# Patient Record
Sex: FEMALE | Race: WHITE | NOT HISPANIC OR LATINO | Employment: FULL TIME | URBAN - METROPOLITAN AREA
[De-identification: names, ages, dates, MRNs, and addresses within clinical notes are randomized per-mention and may not be internally consistent; named-entity substitution may affect disease eponyms.]

---

## 2024-01-23 ENCOUNTER — TRANSFERRED RECORDS (OUTPATIENT)
Dept: HEALTH INFORMATION MANAGEMENT | Facility: CLINIC | Age: 24
End: 2024-01-23
Payer: COMMERCIAL

## 2024-02-05 ENCOUNTER — TRANSCRIBE ORDERS (OUTPATIENT)
Dept: OTHER | Age: 24
End: 2024-02-05

## 2024-02-05 DIAGNOSIS — M24.9 HYPERMOBILITY OF JOINT: Primary | ICD-10-CM

## 2024-02-15 ENCOUNTER — TRANSCRIBE ORDERS (OUTPATIENT)
Dept: OTHER | Age: 24
End: 2024-02-15

## 2024-02-15 DIAGNOSIS — M24.9 HYPERMOBILITY OF JOINT: Primary | ICD-10-CM

## 2024-02-19 ENCOUNTER — TELEPHONE (OUTPATIENT)
Dept: CONSULT | Facility: CLINIC | Age: 24
End: 2024-02-19
Payer: COMMERCIAL

## 2024-02-19 NOTE — TELEPHONE ENCOUNTER
Referral received from outside clinic for patient to be seen in Genetics for Joint Hypermobility/EDS. Patient's chart reviewed by Genetics team--unable to schedule patient as department does not see adult patients for hypermobility/EDS. Letter sent to patient informing them of this.

## 2024-08-21 ENCOUNTER — TRANSCRIBE ORDERS (OUTPATIENT)
Dept: OTHER | Age: 24
End: 2024-08-21

## 2024-08-21 DIAGNOSIS — M24.9 HYPERMOBILITY OF JOINT: Primary | ICD-10-CM

## 2025-04-27 ENCOUNTER — HEALTH MAINTENANCE LETTER (OUTPATIENT)
Age: 25
End: 2025-04-27

## 2025-04-29 ENCOUNTER — OFFICE VISIT (OUTPATIENT)
Dept: RHEUMATOLOGY | Facility: CLINIC | Age: 25
End: 2025-04-29
Payer: COMMERCIAL

## 2025-04-29 VITALS
OXYGEN SATURATION: 100 % | WEIGHT: 252.6 LBS | DIASTOLIC BLOOD PRESSURE: 85 MMHG | SYSTOLIC BLOOD PRESSURE: 134 MMHG | HEART RATE: 74 BPM

## 2025-04-29 DIAGNOSIS — M35.7 BENIGN JOINT HYPERMOBILITY: Primary | ICD-10-CM

## 2025-04-29 DIAGNOSIS — T81.31XD POSTOPERATIVE WOUND DEHISCENCE, SUBSEQUENT ENCOUNTER: ICD-10-CM

## 2025-04-29 DIAGNOSIS — Z31.69 ENCOUNTER FOR PRECONCEPTION CONSULTATION: ICD-10-CM

## 2025-04-29 PROCEDURE — 99204 OFFICE O/P NEW MOD 45 MIN: CPT | Performed by: INTERNAL MEDICINE

## 2025-04-29 PROCEDURE — 3079F DIAST BP 80-89 MM HG: CPT | Performed by: INTERNAL MEDICINE

## 2025-04-29 PROCEDURE — 3075F SYST BP GE 130 - 139MM HG: CPT | Performed by: INTERNAL MEDICINE

## 2025-04-29 RX ORDER — ACETAMINOPHEN 500 MG
500 TABLET ORAL
COMMUNITY

## 2025-04-29 RX ORDER — NORETHINDRONE AND ETHINYL ESTRADIOL
1 KIT EVERY MORNING
COMMUNITY

## 2025-04-29 NOTE — Clinical Note
Please fax my clinic note dated 4/29/2025 to Ms. Oh's PCP:  Mariam Hinton [facility], [facility]  Thank you, Conner Russo MD 4/29/2025 12:30 PM

## 2025-04-29 NOTE — PROGRESS NOTES
Clinic note faxed to  Mariam Hinton CNP, [facility]   Olivia Roe, [facility]  4/29/2025

## 2025-04-29 NOTE — PATIENT INSTRUCTIONS
RHEUMATOLOGY    [facility]  [address] [place] [address]  [place] [address]    Phone number: [phone]  Fax number: [phone]    If you need a medication refill, please contact us as you may need lab work and/or a follow up visit prior to your refill.      Thank you for choosing [facility]!    Olivia Roe CMA Rheumatology

## 2025-04-29 NOTE — PROGRESS NOTES
Rheumatology Clinic Visit      Hyun Oh MRN# [id number]   YOB: 2000 Age: 24 year old      Date of visit: 4/29/25   Referring provider: Mariam Hinton [facility], [facility]    Chief Complaint   Patient presents with:  Consult: PCP thinks she may have a tissue disorder. Has joint pain in ankles. Does not heal from surgery, took 8-9 months from ankle surgery.     Assessment and Plan     1.  Joint hypermobility, and a history of unexplained surgical wound dehiscence: Beighton score for joint hypermobility: 8.  History of posterior heel injury requiring 9 months to heal after surgical correction (cutaneous lesion only; did not involve the Achilles tendon per patient), and 8 months to heal after a tarsal tunnel surgery when she was 17 years old.  She notes that the tarsal tunnel surgery was complicated by wound dehiscence, wound vac disrupting the sutures, and general delayed healing.  She reports that she was discussing conception planning with her primary care provider and wanted to figure out why she healed so slowly after the tarsal tunnel surgery, prompting referral to genetics and rheumatology.  I agree with joint hypermobility diagnosis and we discussed the rationale for starting physical therapy and avoiding joint hyperextension; patient is in agreement with going to physical therapy.  Long-term management of joint hypermobility per PCP.  We also discussed genetics referral and I agree with her primary care providers referral to genetics; will place another referral to genetics today as well.  Normal echocardiogram on 2/12/2024 at [facility].   - Physical therapy referral for joint hypermobility  - Genetics referral for joint hypermobility and history of unexplained surgical wound dehiscence    2.  Hyperhidrosis: Primarily affecting hands and feet.  She has followed with her primary care provider for this and dermatology in the distant past; advised that she consider seeing  dermatology again to see if there are newer treatments she could consider, if this bothers her.    3.  Possible Raynaud's?:  White discoloration of her fingers with cold exposure reported by the patient but no other color changes.  Reportedly controlled with cold avoidance that she will continue doing.    Total minutes spent in evaluation with patient, documentation, , and review of pertinent studies and chart notes: 48     Ms. Oh verbalized agreement with and understanding of the rational for the diagnosis and treatment plan.  All questions were answered to best of my ability and the patient's satisfaction. Ms. Oh was advised to contact the clinic with any questions that may arise after the clinic visit.      Thank you for involving me in the care of the patient    Return to clinic: No scheduled return appointment in rheumatology needed at this time. Return PRN.     HPI   Hyun Oh is a 24 year old female with a past medical history significant for hyperhidrosis, chronic pain in both ankles, and joint hypermobility who presents for evaluation of joint hypermobility and history of delayed wound healing    2/12/2024 [facility] cardiology note documents an echocardiogram that is normal.    1/23/2024 family medicine note documents hyperhidrosis and delayed healing after ankle surgery and with small wounds.  Also with hypermobile joints.    Today, 4/29/2025: bilateral tarsal tunnel surgery at the age 18 yo, requiring 4 surgeries because of wound dehiscence, the wound vac disrupting the sutures, and eventually healing after 8 months. Then when discussing family planning with her PCP, the patient states that she says she didn't want to conceive until she has figured out the issue with her healing delay as.  She also notes that she had a cutaneous lesion on the posterior aspect of her left heel, not involving the Achilles tendon, that took 9 months to heal.  Knee pain with long walks or when  playing tennis or pickleball so avoids sports. Better with rest. Hips and back are similar in quality and also worse with activity.  Morning stiffness for about 10 minutes.  Upper extremities are okay.     White discoloration of the fingers with cold exposure that is controlled with cold avoidance/keeping warm.  No other color changes of the fingers.    Hyperthyrosis of the hands that she says is managed by her PCP and she has seen dermatology in the past for this.    Denies fevers, chills, nausea, vomiting, constipation, diarrhea.  No black or bloody stools.  No hematuria.  No abdominal pain. No chest pain/pressure, palpitations, or shortness of breath. No LE swelling. No neck pain. No oral or nasal sores.  No rash. No sicca symptoms. No photosensitivity or photophobia. No eye pain or redness. No history of inflammatory eye disease.  No history of inflammatory bowel disease.  No history of DVT, pulmonary embolism, or miscarriage.   No history of serositis.  No known neurologic disorder.  No known renal disorder.    Mother: fibromylaiga, Raynauds    Tobacco: None  EtOH: Social  Drugs: None  Occupation: Works at a bank    ROS   12 point review of system was completed and negative except as noted in the HPI     Active Problem List   There is no problem list on file for this patient.    Past Medical History   History reviewed. No pertinent past medical history.  Past Surgical History   History reviewed. No pertinent surgical history.  Allergy     Allergies   Allergen Reactions    Amoxicillin-Pot Clavulanate Rash     Current Medication List     Current Outpatient Medications   Medication Sig Dispense Refill    acetaminophen (TYLENOL) 500 MG tablet Take 500 mg by mouth.      ALYACEN 7/7/7 0.5/0.75/1-35 MG-MCG tablet Take 1 tablet by mouth every morning.       No current facility-administered medications for this visit.       No current facility-administered medications for this visit.     Social History   See  HPI    Family History     Family History   Problem Relation Age of Onset    Fibromyalgia Mother        Physical Exam     Temp Readings from Last 3 Encounters:   No data found for Temp     BP Readings from Last 5 Encounters:   04/29/25 134/85     Pulse Readings from Last 1 Encounters:   04/29/25 74     Resp Readings from Last 1 Encounters:   No data found for Resp     There is no height or weight on file to calculate BMI.    GEN: NAD.   HEENT:  Anicteric, noninjected sclera. No obvious external lesions of the ear and nose. Hearing intact.  CV: S1, S2. RRR. No m/r/g  PULM: No increased work of breathing. CTA bilaterally   MSK: MCPs, PIPs, DIPs without swelling or tenderness to palpation.  Wrists without swelling or tenderness to palpation.  Elbows and shoulders without swelling or tenderness to palpation.  Shoulders with normal range of motion.  Knees, ankles, and MTPs without swelling or tenderness to palpation.  Able to passively dorsiflex the fifth MCPs by at least 90 , oppose the thumbs to the volar aspects of the ipsilateral forearms, hyperextend the elbows by at least 10 , and hyperextend the knees at least 10 ; unable to place hands flat on the floor without bending the knees    SKIN: No rash or jaundice seen  PSYCH: Alert. Appropriate.      Immunization History     Immunization History   Administered Date(s) Administered    COVID-19 Bivalent 12+ (Pfizer) 02/24/2023    COVID-19 MONOVALENT 12+ (Pfizer) 11/09/2021, 12/01/2021          Chart documentation done in part with Dragon Voice recognition Software. Although reviewed after completion, some word and grammatical error may remain.    Conner Russo MD